# Patient Record
Sex: MALE | Race: WHITE | NOT HISPANIC OR LATINO | Employment: FULL TIME | URBAN - METROPOLITAN AREA
[De-identification: names, ages, dates, MRNs, and addresses within clinical notes are randomized per-mention and may not be internally consistent; named-entity substitution may affect disease eponyms.]

---

## 2022-05-09 ENCOUNTER — OFFICE VISIT (OUTPATIENT)
Dept: ORTHOPEDIC SURGERY | Facility: CLINIC | Age: 22
End: 2022-05-09

## 2022-05-09 VITALS
WEIGHT: 222 LBS | SYSTOLIC BLOOD PRESSURE: 150 MMHG | BODY MASS INDEX: 30.07 KG/M2 | HEART RATE: 80 BPM | DIASTOLIC BLOOD PRESSURE: 78 MMHG | HEIGHT: 72 IN

## 2022-05-09 DIAGNOSIS — M25.562 ACUTE PAIN OF LEFT KNEE: Primary | ICD-10-CM

## 2022-05-09 PROCEDURE — 99203 OFFICE O/P NEW LOW 30 MIN: CPT | Performed by: ORTHOPAEDIC SURGERY

## 2022-05-09 NOTE — PATIENT INSTRUCTIONS
MRI follow-up instructions    Today at your office visit, Dr. Leonard recommended an MRI (magnetic resonance imaging) to evaluate your joint pain.  This requires a precertification process, which our office will do, and then we will contact you when it is approved and go over scheduling options.  We typically recommend these to be performed at [facility] or [facility].  If for some reason it is performed elsewhere please arrange to have that facility give you a disc with your images on it so Dr. Leonard can review it at your follow-up visit.    When checking out today we recommend making an appointment to go over your results in approximately two weeks.  If your MRI is done sooner than that we would be happy to schedule you sooner to go over your results, just contact us at [phone] or through the [employer] portal to let us know your MRI is completed.  Seeing you in person for the results gives us the best opportunity to look at your images together and explain the diagnosis and treatment options to best help you.

## 2022-05-09 NOTE — PROGRESS NOTES
"     Patient ID: Mele Ashton is a 22 y.o. male.    Chief Complaint:    Chief Complaint   Patient presents with   • Left Knee - Initial Evaluation, Pain     Pain 1-2       HPI:  This is a 22-year-old gentleman here with several months of left knee pain.  He states he had arthroscopy with what sounds like meniscectomy several years ago and did well.  No new injury.  Has activity related pain mainly over the lateral joint line worse at his job.  Has tried to rest it and use oral medication but symptoms continue to return.  Occasionally knee catches and locks  Past Medical History:   Diagnosis Date   • Hypertension        History reviewed. No pertinent surgical history.    History reviewed. No pertinent family history.       Social History     Occupational History   • Not on file   Tobacco Use   • Smoking status: Never Smoker   • Smokeless tobacco: Never Used   Vaping Use   • Vaping Use: Never used   Substance and Sexual Activity   • Alcohol use: Not on file     Comment: occasionally   • Drug use: Never   • Sexual activity: Not on file      Review of Systems   Cardiovascular: Negative for chest pain.   Musculoskeletal: Positive for arthralgias.       Objective:    /78   Pulse 80   Ht 182.9 cm (72\")   Wt 101 kg (222 lb)   BMI 30.11 kg/m²     Physical Examination:  Left knee demonstrates healed arthroscopy portals mild effusion mild lateral joint line tenderness range of motion 0-1 35 no instability positive lateral Jose for popping.  Sensory and motor exam are intact in all distributions. Dorsalis pedis and posterior tibialis pulses are palpable and capillary refill is less than two seconds to all digits.    Imaging:  left Knee X-Ray  Indication: Left knee pain history of meniscus surgery  AP, Lateral views, [place]  Findings: Well-maintained joint spaces with lateral tilt of the patella  no bony lesion  Soft tissues normal  normal joint spaces  Hardware appropriately positioned not applicable      no " prior studies available for comparison.  Assessment:  Left knee pain possible meniscus tear    Plan:    I recommend MRI    Procedures         Disclaimer: Part of this note may be an electronic transcription/translation of spoken language to printed text using the Dragon Dictation System

## 2022-06-10 ENCOUNTER — APPOINTMENT (OUTPATIENT)
Dept: MRI IMAGING | Facility: HOSPITAL | Age: 22
End: 2022-06-10